# Patient Record
Sex: MALE | Race: WHITE | NOT HISPANIC OR LATINO
[De-identification: names, ages, dates, MRNs, and addresses within clinical notes are randomized per-mention and may not be internally consistent; named-entity substitution may affect disease eponyms.]

---

## 2017-06-15 ENCOUNTER — APPOINTMENT (OUTPATIENT)
Dept: SURGICAL ONCOLOGY | Facility: CLINIC | Age: 62
End: 2017-06-15

## 2018-12-16 ENCOUNTER — TRANSCRIPTION ENCOUNTER (OUTPATIENT)
Age: 63
End: 2018-12-16

## 2021-09-26 ENCOUNTER — NON-APPOINTMENT (OUTPATIENT)
Age: 66
End: 2021-09-26

## 2021-10-11 ENCOUNTER — NON-APPOINTMENT (OUTPATIENT)
Age: 66
End: 2021-10-11

## 2021-10-11 ENCOUNTER — APPOINTMENT (OUTPATIENT)
Dept: CARDIOLOGY | Facility: CLINIC | Age: 66
End: 2021-10-11
Payer: MEDICARE

## 2021-10-11 VITALS
SYSTOLIC BLOOD PRESSURE: 128 MMHG | OXYGEN SATURATION: 98 % | HEIGHT: 68 IN | BODY MASS INDEX: 33.34 KG/M2 | DIASTOLIC BLOOD PRESSURE: 80 MMHG | HEART RATE: 58 BPM | WEIGHT: 220 LBS

## 2021-10-11 VITALS — DIASTOLIC BLOOD PRESSURE: 66 MMHG | SYSTOLIC BLOOD PRESSURE: 130 MMHG

## 2021-10-11 DIAGNOSIS — R94.31 ABNORMAL ELECTROCARDIOGRAM [ECG] [EKG]: ICD-10-CM

## 2021-10-11 DIAGNOSIS — Z80.3 FAMILY HISTORY OF MALIGNANT NEOPLASM OF BREAST: ICD-10-CM

## 2021-10-11 DIAGNOSIS — U07.1 COVID-19: ICD-10-CM

## 2021-10-11 PROCEDURE — 93000 ELECTROCARDIOGRAM COMPLETE: CPT

## 2021-10-11 PROCEDURE — 99204 OFFICE O/P NEW MOD 45 MIN: CPT

## 2021-10-11 PROCEDURE — 36415 COLL VENOUS BLD VENIPUNCTURE: CPT

## 2021-10-11 RX ORDER — ALLOPURINOL 100 MG/1
100 TABLET ORAL
Refills: 0 | Status: ACTIVE | COMMUNITY

## 2021-10-11 RX ORDER — GLIPIZIDE 10 MG/1
10 TABLET ORAL
Refills: 0 | Status: ACTIVE | COMMUNITY

## 2021-10-11 RX ORDER — INSULIN ASPART 100 [IU]/ML
INJECTION, SOLUTION INTRAVENOUS; SUBCUTANEOUS
Refills: 0 | Status: ACTIVE | COMMUNITY

## 2021-10-11 NOTE — PHYSICAL EXAM
[Well Developed] : well developed [Well Nourished] : well nourished [No Acute Distress] : no acute distress [Obese] : obese [Normal S1, S2] : normal S1, S2 [Murmur] : murmur [Normal] : alert and oriented, normal memory [de-identified] : I/VI MURALI base [de-identified] : right base crackles [de-identified] : 1+ bilat leg and ankle pitting edema

## 2021-10-11 NOTE — DISCUSSION/SUMMARY
[FreeTextEntry1] : Sched echocardiogram to eval LV function given ECG consistent with prior MI, and dyspnea.\par Sched pharmacol nuc stress test to eval for myocardial ischemia given mult CV risk factors incl diabetes, abn ECG and recent symptom of HAGAN which may be anginal equivalent.  Pt unable to exercise on treadmill due to chronic back pain.\par check pro-PNP.\par Encourage weight loss and conditioning.\par Advised decrease metoprolol to 25 mg QD due to bradycardia.\par Obtain recent labs incl lipids - consider statin.\par \par

## 2021-10-11 NOTE — ASSESSMENT
[FreeTextEntry1] : Multiple CV risk factors incl diabetes.\par Dyspnea on exertion - possible anginal equivalent or CHF.\par May be multifactorial in etio incl obesity, deconditioning, prior COVID.\par \par

## 2021-10-11 NOTE — HISTORY OF PRESENT ILLNESS
[FreeTextEntry1] : ALONSO CARDENAS is a 66 year old male.\par Presents at rec of his pulmonary doctor.  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  He had COVID in January and had a prolonged cough that improved.  He went back to work in April.\par Compl of HAGAN on singing, walking.\par He has history of chronic back problem due to herniated discs and surgery with hardward.\par Was also getting upper abdominal pain with singing.\par No chest pain.\par Had labs done recently by PCP Dr Quintin Mota in [place].\par Meds reviewed w pt, reports he is also on amlodipine but unknown dose.\par Last stress test 2010 - pharmacologic due to chronic back pain.

## 2021-10-12 LAB — NT-PROBNP SERPL-MCNC: 120 PG/ML

## 2021-10-15 ENCOUNTER — APPOINTMENT (OUTPATIENT)
Dept: GASTROENTEROLOGY | Facility: CLINIC | Age: 66
End: 2021-10-15
Payer: MEDICARE

## 2021-10-15 VITALS
WEIGHT: 228 LBS | HEART RATE: 60 BPM | DIASTOLIC BLOOD PRESSURE: 62 MMHG | BODY MASS INDEX: 34.56 KG/M2 | SYSTOLIC BLOOD PRESSURE: 132 MMHG | HEIGHT: 68 IN

## 2021-10-15 DIAGNOSIS — K43.9 VENTRAL HERNIA W/OUT OBSTRUCTION OR GANGRENE: ICD-10-CM

## 2021-10-15 DIAGNOSIS — Z86.010 PERSONAL HISTORY OF COLONIC POLYPS: ICD-10-CM

## 2021-10-15 DIAGNOSIS — E66.9 OBESITY, UNSPECIFIED: ICD-10-CM

## 2021-10-15 DIAGNOSIS — K59.09 OTHER CONSTIPATION: ICD-10-CM

## 2021-10-15 PROCEDURE — 82274 ASSAY TEST FOR BLOOD FECAL: CPT | Mod: QW

## 2021-10-15 PROCEDURE — 99204 OFFICE O/P NEW MOD 45 MIN: CPT

## 2021-10-15 NOTE — ASSESSMENT
[FreeTextEntry1] : 1.  History of colonic polyps; chronic constipation--rule out metachronous colorectal neoplasia.  Status post colon resections, reversal of colostomy.\par 2.  Occasional heartburn, without alarm symptoms.\par 3.  Abdominal wall hernia, asymptomatic.\par 4.  Obesity.\par 5.  Longstanding type 2 diabetes mellitus with nephropathy (CKD 3).\par 6.  Hypertension.\par 7.  Exertional dyspnea--must rule out underlying coronary artery disease.\par 8.  Status post partial thyroidectomy for follicular adenoma 2015.\par 9.  Status post COVID-19 January 2021.\par 10.  DJD of spine, status post laminectomy.\par 11.  Status post leg, elbow, hand surgeries, and neck sebaceous cyst excision.\par \par Plan:\par 1.  Dr. Mota to forward latest labs for my review.\par 2.  Take low-dose MiraLAX regularly rather than on-demand.\par 3.  Await cardiac clearance before proceeding with surveillance colonoscopy.\par 4.  Colonoscopy next month, if clearable. He was advised to speak with his diabetes doctor about how to adjust his diabetes medicines prior to contemplated colonoscopy.  Procedure, rationale, alternatives, material risks, anesthesia plan, and PEG prep instructions were reviewed and brochure given.\par 5.  No plans for EGD in the absence of alarm symptoms.\par 6.  Would not advise abdominal wall hernia repair in the absence of symptoms.\par

## 2021-10-15 NOTE — PHYSICAL EXAM
[General Appearance - In No Acute Distress] : in no acute distress [General Appearance - Alert] : alert [General Appearance - Well Nourished] : well nourished [General Appearance - Well Developed] : well developed [Sclera] : the sclera and conjunctiva were normal [Neck Cervical Mass (___cm)] : no neck mass was observed [Jugular Venous Distention Increased] : there was no jugular-venous distention [Thyroid Diffuse Enlargement] : the thyroid was not enlarged [Thyroid Nodule] : there were no palpable thyroid nodules [Auscultation Breath Sounds / Voice Sounds] : lungs were clear to auscultation bilaterally [Heart Sounds] : normal S1 and S2 [Heart Sounds Gallop] : no gallops [Murmurs] : no murmurs [Heart Sounds Pericardial Friction Rub] : no pericardial rub [Full Pulse] : the pedal pulses are present [Edema] : there was no peripheral edema [Bowel Sounds] : normal bowel sounds [Abdomen Soft] : soft [Abdomen Tenderness] : non-tender [Abdomen Mass (___ Cm)] : no abdominal mass palpated [Normal Sphincter Tone] : normal sphincter tone [No Rectal Mass] : no rectal mass [Internal Hemorrhoid] : internal hemorrhoids [Prostate Size___ (Scale 0-4)] : prostate size was [unfilled] on a scale of 0-4 [Cervical Lymph Nodes Enlarged Posterior Bilaterally] : posterior cervical [Cervical Lymph Nodes Enlarged Anterior Bilaterally] : anterior cervical [Supraclavicular Lymph Nodes Enlarged Bilaterally] : supraclavicular [Inguinal Lymph Nodes Enlarged Bilaterally] : inguinal [Skin Turgor] : normal skin turgor [Skin Color & Pigmentation] : normal skin color and pigmentation [] : no rash [Oriented To Time, Place, And Person] : oriented to person, place, and time [Impaired Insight] : insight and judgment were intact [Affect] : the affect was normal [External Hemorrhoid] : no external hemorrhoids [Occult Blood Positive] : stool was negative for occult blood [Prostate Tenderness] : was not tender [FreeTextEntry1] : surgical scars

## 2021-10-15 NOTE — CONSULT LETTER
[Dear  ___] : Dear  [unfilled], [Consult Letter:] : I had the pleasure of evaluating your patient, [unfilled]. [Please see my note below.] : Please see my note below. [Consult Closing:] : Thank you very much for allowing me to participate in the care of this patient.  If you have any questions, please do not hesitate to contact me. [Sincerely,] : Sincerely, [FreeTextEntry3] : Julián Kirk M.D.\par  [DrShayy  ___] : Dr. WESTFALL

## 2021-10-15 NOTE — REVIEW OF SYSTEMS
[Negative] : Heme/Lymph [Shortness Of Breath] : shortness of breath [As Noted in HPI] : as noted in HPI [Constipation] : constipation [Heartburn] : heartburn

## 2021-10-15 NOTE — HISTORY OF PRESENT ILLNESS
[FreeTextEntry1] : Diego had undergone colon resection 1984 with colostomy that was reversed in 1985--he was advised that he might have swallowed some glass, and that was the cause of perforation.  He underwent another resection in 2010 (perforation, not cancer),  now has abdominal wall hernia.  He recalls having a polyp removed via colonoscopy many years ago; last colonoscopy was immediately prior to the 2010 surgery.  He notes chronic constipation, taking MiraLAX 1-2 times per week.  He also experiences rare heartburn, for which he would take OTC acid-reducer, with improvement.  He was diagnosed with COVID-19 this past January, has dyspnea on exertion and when singing, with recent pulmonary evaluation negative, to undergo cardiac evaluation (Dr. Canada) in the next 1-2 weeks.  He has longstanding type 2 diabetes mellitus with nephropathy.  Family history is negative for colorectal neoplasia.

## 2021-10-22 ENCOUNTER — APPOINTMENT (OUTPATIENT)
Dept: CARDIOLOGY | Facility: CLINIC | Age: 66
End: 2021-10-22
Payer: MEDICARE

## 2021-10-22 PROCEDURE — A9500: CPT

## 2021-10-22 PROCEDURE — 78452 HT MUSCLE IMAGE SPECT MULT: CPT

## 2021-10-22 PROCEDURE — 93015 CV STRESS TEST SUPVJ I&R: CPT

## 2021-10-26 ENCOUNTER — APPOINTMENT (OUTPATIENT)
Dept: CARDIOLOGY | Facility: CLINIC | Age: 66
End: 2021-10-26
Payer: MEDICARE

## 2021-10-26 PROCEDURE — 93306 TTE W/DOPPLER COMPLETE: CPT

## 2021-10-28 ENCOUNTER — APPOINTMENT (OUTPATIENT)
Dept: CARDIOLOGY | Facility: CLINIC | Age: 66
End: 2021-10-28

## 2021-11-01 ENCOUNTER — APPOINTMENT (OUTPATIENT)
Dept: CARDIOLOGY | Facility: CLINIC | Age: 66
End: 2021-11-01
Payer: MEDICARE

## 2021-11-01 VITALS
OXYGEN SATURATION: 97 % | BODY MASS INDEX: 34.56 KG/M2 | SYSTOLIC BLOOD PRESSURE: 140 MMHG | HEIGHT: 68 IN | DIASTOLIC BLOOD PRESSURE: 70 MMHG | WEIGHT: 228 LBS | HEART RATE: 58 BPM

## 2021-11-01 VITALS — DIASTOLIC BLOOD PRESSURE: 70 MMHG | SYSTOLIC BLOOD PRESSURE: 126 MMHG

## 2021-11-01 DIAGNOSIS — R06.00 DYSPNEA, UNSPECIFIED: ICD-10-CM

## 2021-11-01 DIAGNOSIS — I10 ESSENTIAL (PRIMARY) HYPERTENSION: ICD-10-CM

## 2021-11-01 DIAGNOSIS — Z86.79 PERSONAL HISTORY OF OTHER DISEASES OF THE CIRCULATORY SYSTEM: ICD-10-CM

## 2021-11-01 PROCEDURE — 99214 OFFICE O/P EST MOD 30 MIN: CPT

## 2021-11-01 RX ORDER — METOPROLOL SUCCINATE 25 MG/1
25 TABLET, EXTENDED RELEASE ORAL
Refills: 0 | Status: ACTIVE | COMMUNITY

## 2021-11-01 NOTE — DISCUSSION/SUMMARY
[FreeTextEntry1] : Stable and optimized cardiac status without contraindication to endoscopic GI procedures as planned.\par Contin current antihypertensive meds.\par Discussed adding statin given hist of diabetes.  Pt reports intolerance previously to statin.\par Will try to ascertain which med and dose and consider rechallenging.\par Follow up for repeat echo 1 year.\par

## 2021-11-01 NOTE — HISTORY OF PRESENT ILLNESS
[FreeTextEntry1] : ALONSO CARDENAS is a 66 year old male here for follow up.\par No new complaints. \par Echo showed normal LV function with calcified AV but no AS, and mild mitral stenosis.\par Pharm nuc stress test was negative for ischemia.\par

## 2021-11-01 NOTE — PHYSICAL EXAM
[Well Developed] : well developed [Well Nourished] : well nourished [No Acute Distress] : no acute distress [Obese] : obese [Normal Conjunctiva] : normal conjunctiva [Normal Venous Pressure] : normal venous pressure [No Carotid Bruit] : no carotid bruit [Normal S1, S2] : normal S1, S2 [No Rub] : no rub [No Gallop] : no gallop [Murmur] : murmur [Clear Lung Fields] : clear lung fields [Good Air Entry] : good air entry [No Respiratory Distress] : no respiratory distress  [Soft] : abdomen soft [Non Tender] : non-tender [No Masses/organomegaly] : no masses/organomegaly [Normal Bowel Sounds] : normal bowel sounds [Normal Gait] : normal gait [No Edema] : no edema [No Cyanosis] : no cyanosis [No Clubbing] : no clubbing [No Varicosities] : no varicosities [No Rash] : no rash [No Skin Lesions] : no skin lesions [Moves all extremities] : moves all extremities [No Focal Deficits] : no focal deficits [Normal Speech] : normal speech [Alert and Oriented] : alert and oriented [Normal memory] : normal memory [de-identified] : I/VI MURALI base

## 2021-11-02 ENCOUNTER — NON-APPOINTMENT (OUTPATIENT)
Age: 66
End: 2021-11-02

## 2021-11-19 ENCOUNTER — LABORATORY RESULT (OUTPATIENT)
Age: 66
End: 2021-11-19

## 2021-11-19 ENCOUNTER — APPOINTMENT (OUTPATIENT)
Dept: DISASTER EMERGENCY | Facility: CLINIC | Age: 66
End: 2021-11-19

## 2021-11-24 ENCOUNTER — APPOINTMENT (OUTPATIENT)
Dept: GASTROENTEROLOGY | Facility: CLINIC | Age: 66
End: 2021-11-24
Payer: MEDICARE

## 2021-11-24 PROCEDURE — 45378 DIAGNOSTIC COLONOSCOPY: CPT

## 2023-07-26 ENCOUNTER — APPOINTMENT (OUTPATIENT)
Dept: ORTHOPEDIC SURGERY | Facility: CLINIC | Age: 68
End: 2023-07-26
Payer: OTHER MISCELLANEOUS

## 2023-07-26 VITALS — HEIGHT: 68 IN | WEIGHT: 217 LBS | BODY MASS INDEX: 32.89 KG/M2

## 2023-07-26 DIAGNOSIS — M54.14 RADICULOPATHY, THORACIC REGION: ICD-10-CM

## 2023-07-26 PROCEDURE — 72100 X-RAY EXAM L-S SPINE 2/3 VWS: CPT

## 2023-07-26 PROCEDURE — 99204 OFFICE O/P NEW MOD 45 MIN: CPT

## 2023-07-26 PROCEDURE — 72070 X-RAY EXAM THORAC SPINE 2VWS: CPT

## 2023-07-26 RX ORDER — DICLOFENAC SODIUM 75 MG/1
75 TABLET, DELAYED RELEASE ORAL TWICE DAILY
Qty: 60 | Refills: 1 | Status: COMPLETED | COMMUNITY
Start: 2023-07-26 | End: 2023-09-24

## 2023-07-28 NOTE — IMAGING
[de-identified] : Lumbar Spine Exam: DTRs are knees 1+ equal, ankles trace equal. SLR is negative bilaterally. Lumbar flexion is to approximately 45 degrees and is uncomfortable diffusely. Lumbar extension is immediately painful, with greater intensity than lumbar flexion. Pt is able to heel and toe walk. \par \par Thoracic Spine Exam: There were no thoracic radicular symptoms during the exam.\par \par Lumbar Spine X-Rays: Normal lordosis. Fusion L5-S1 with pedicle screw fixation. \par \par Thoracic Spine X-Rays: Multilevel DDD. No compression fractures. Diffuse osteopenia.

## 2023-07-28 NOTE — HISTORY OF PRESENT ILLNESS
[Work related] : work related [Gradual] : gradual [8] : 8 [Burning] : burning [Dull/Aching] : dull/aching [Radiating] : radiating [Sharp] : sharp [Shooting] : shooting [Stabbing] : stabbing [Throbbing] : throbbing [Tightness] : tightness [Constant] : constant [Household chores] : household chores [Leisure] : leisure [Sleep] : sleep [Nothing helps with pain getting better] : Nothing helps with pain getting better [Sitting] : sitting [Walking] : walking [Stairs] : stairs [Lying in bed] : lying in bed [Not working due to injury] : Work status: not working due to injury [de-identified] : 07/26/2023: WC DOI: 3/22/06\par Pt reports that he has been seen for the same care in 2006 for the lumbar spine, had surgery. Patient states pain has increased over a year now, has pain in the both legs and bilateral hips . Unable to walk without pain.  Pt reports that he feels weakness in his legs and that his legs feel wobbly while walking.  [] : no [FreeTextEntry1] : Thoracic spine  [FreeTextEntry2] : 3/22/06 [FreeTextEntry5] : 68 Year old male is here WC for the Thoracic spine, patient states he has been seen for the same care in 2006 for the lumbar spine, had surgery. Patient states pain has increased over a year now, has pain in the both legs and bilateral hips . Unable to walk without pain.  [FreeTextEntry7] : Arms, shoulder, hips and down the leg [de-identified] : 2006 [de-identified] : 2006

## 2023-07-31 ENCOUNTER — APPOINTMENT (OUTPATIENT)
Dept: MRI IMAGING | Facility: CLINIC | Age: 68
End: 2023-07-31
Payer: OTHER MISCELLANEOUS

## 2023-07-31 PROCEDURE — 72146 MRI CHEST SPINE W/O DYE: CPT

## 2023-07-31 PROCEDURE — 72148 MRI LUMBAR SPINE W/O DYE: CPT

## 2023-08-09 ENCOUNTER — APPOINTMENT (OUTPATIENT)
Dept: ORTHOPEDIC SURGERY | Facility: CLINIC | Age: 68
End: 2023-08-09
Payer: OTHER MISCELLANEOUS

## 2023-08-09 VITALS — WEIGHT: 217 LBS | BODY MASS INDEX: 32.89 KG/M2 | HEIGHT: 68 IN

## 2023-08-09 PROCEDURE — 99214 OFFICE O/P EST MOD 30 MIN: CPT

## 2023-08-09 RX ORDER — IBUPROFEN 800 MG/1
800 TABLET, FILM COATED ORAL TWICE DAILY
Qty: 60 | Refills: 0 | Status: ACTIVE | COMMUNITY
Start: 2023-08-09 | End: 1900-01-01

## 2023-08-09 NOTE — HISTORY OF PRESENT ILLNESS
[Lower back] : lower back [Work related] : work related [Gradual] : gradual [8] : 8 [Burning] : burning [Dull/Aching] : dull/aching [Radiating] : radiating [Sharp] : sharp [Shooting] : shooting [Stabbing] : stabbing [Throbbing] : throbbing [Tightness] : tightness [Constant] : constant [Household chores] : household chores [Leisure] : leisure [Sleep] : sleep [Nothing helps with pain getting better] : Nothing helps with pain getting better [Sitting] : sitting [Walking] : walking [Stairs] : stairs [Lying in bed] : lying in bed [Not working due to injury] : Work status: not working due to injury [de-identified] : 07/26/2023:  DOI: 3/22/06 Pt reports that he has been seen for the same care in 2006 for the lumbar spine, had surgery. Patient states pain has increased over a year now, has pain in the both legs and bilateral hips . Unable to walk without pain.  Pt reports that he feels weakness in his legs and that his legs feel wobbly while walking.   08/09/2023:  DOI: 3/22/06 Pt is here today for a follow up for Thoracic and Lumbar spine MRI results. Pt reports that diclofenac produced no improvement at all. His main problem is greatly reduced walking and standing endurance. His walking endurance is less than a city block. He is forced to sit and wait for the symptoms to subside. The pain which brings him to a stop is symmetrical and includes paresthesias, sensory changes and pain in both thighs, consistent with neuroclaudication. There is no trend of improvement.   ** Lumbar Spine MRI results taken on 07/31/23** IMPRESSION: 1. T10-T11: Loss of disc signal and height. Broad bulge. 2. T11-T12: Loss of disc signal and height. Broad bulge and superimposed herniation. 3. L1-L2: Facet hypertrophy, ligamentum flavum hypertrophy, and left facet effusion. 4. L2-L3: Facet hypertrophy and ligamentum flavum hypertrophy. 5. L3-L4: Facet hypertrophy, ligamentum flavum hypertrophy, and facet effusion. 6. L4-L5: Loss of disc signal and height. Minor retrolisthesis. Broad bulge, facet arthrosis, ligamentum flavum hypertrophy, and facet effusion with foraminal stenosis. Broad herniation with mild-to-moderate central stenosis mostly due to lateral mass effect on the thecal sac by the facet arthrosis. Findings are progressed from the prior study.  7. L5-S1: Postop change with bilateral pedicle screws. Disc graft in good position asymmetric to right at midline. Bulge and facet arthrosis with inferior right foraminal stenosis. Epidural lipomatosis with moderate central stenosis. Findings are not significantly changed from the prior study. 8. 10 mm nonspecific lesion at posterior inferior L4 with minimal surrounding edema. This is unchanged from the prior study and may represent atypical hemangioma. Thin-cut CAT scan is suggested for further evaluation.   ** Thoracic Spine MRI results taken on 07/31/23** 1. Multilevel loss of disc signal and height with no fracture. 2. T5-T6: There is central herniation. 3. T6-T7: There is central and asymmetric to right herniation at T6-T7. 4. T7-T8: Central and asymmetric to left herniation and annular fissure. 5. T8-T9: Central herniation and annular fissure.  [] : no [FreeTextEntry1] : Thoracic spine  [FreeTextEntry2] : 3/22/06 [FreeTextEntry5] : 68 Year old male is here WC for the Thoracic spine, patient states he has been seen for the same care in 2006 for the lumbar spine, had surgery. Patient states pain has increased over a year now, has pain in the both legs and bilateral hips . Unable to walk without pain.  [FreeTextEntry7] : Arms, shoulder, hips and down the leg [de-identified] : 2006 [de-identified] : 2006

## 2023-08-09 NOTE — DISCUSSION/SUMMARY
[de-identified] : 1) I discussed the risks, benefits and alternatives of treatment options for the thoracic and lumbar spine, including activity modification, rest, home exercise, oral antiinflammatory medication,  2) ** Rx Ibuprofen 800 mg 1BID PRN. The patient may combine each dose of this medication with 1 tablet of OTC Tylenol ES (500mg). 3) Pt will continue with activity modification and home exercise as tolerated.  The patient should avoid exercise and activity that aggravates pain.  4) I highly recommend that the patient consult with Dr. Mclaughlin regarding workup of lumbar spinal stenosis.    The patient will continue with conservative treatment as described above, and will F/U after consulting with Dr. Mclaughlin.    The patient was advised of the diagnosis.  The natural history of the pathology was explained in full to the patient in layman's terms, including but not limited to the risks, symptoms and available options for treatment.  We discussed the risks, benefits and alternatives of the treatment options and the advice I provided to the patient as listed above.  Pt was given the opportunity to ask questions, and all questions were answered.  The discussion was not limited to the above.  Entered by Yumiko Zamorano acting as scribe.

## 2023-08-09 NOTE — IMAGING
[de-identified] : Lumbar Spine Exam: DTRs are knees 1+ equal, ankles trace equal. SLR is negative bilaterally. Lumbar flexion is to approximately 45 degrees and is uncomfortable diffusely. Lumbar extension is immediately painful, with greater intensity than lumbar flexion. Pt is able to heel and toe walk. \par  \par  Thoracic Spine Exam: There were no thoracic radicular symptoms during the exam.\par  \par  Lumbar Spine X-Rays: Normal lordosis. Fusion L5-S1 with pedicle screw fixation. \par  \par  Thoracic Spine X-Rays: Multilevel DDD. No compression fractures. Diffuse osteopenia.

## 2023-08-09 NOTE — ASSESSMENT
[FreeTextEntry1] : MRIs of the thoracic spine and lumbar spine performed through [facility] on 07/31/23 were discussed in detail with the patient. There is central canal stenosis seen at L4-L5 and at L5-S1. L5-S1 is the site of fusion with pedicle screws by Dr. Taylor in approximately 2010. As far as treatment is concerned, he will consult with Dr. Mclaughlin regarding treatment of spinal stenosis. Medication will remain at his current regimen of ibuprofen 800 mg and a dose of Tylenol will be added to this.

## 2023-08-21 ENCOUNTER — APPOINTMENT (OUTPATIENT)
Dept: ORTHOPEDIC SURGERY | Facility: CLINIC | Age: 68
End: 2023-08-21

## 2023-08-22 ENCOUNTER — APPOINTMENT (OUTPATIENT)
Dept: ORTHOPEDIC SURGERY | Facility: CLINIC | Age: 68
End: 2023-08-22
Payer: OTHER MISCELLANEOUS

## 2023-08-22 VITALS — WEIGHT: 217 LBS | HEIGHT: 68 IN | BODY MASS INDEX: 32.89 KG/M2

## 2023-08-22 PROCEDURE — 99214 OFFICE O/P EST MOD 30 MIN: CPT
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
## 2023-08-24 NOTE — ASSESSMENT
[FreeTextEntry1] : Chronic LBP w/ b/l radic; h/o of L5-S1 fusion in 2006. Symptoms relate to spinal stenosis. LS MRI: L4-5 HNP with moderate central stenosis, L5-S1 prior fusion. TS MRI: w/o instability, deformities or fx. Had SAMARIA's in the past w/o relief. Start PT for lumbar and thoracic pain. Discussed if PT isn't helpful will defer to pain mgmt. F/up 4-6 weeks. Prescribed pain meds.

## 2023-08-24 NOTE — DATA REVIEWED
[MRI] : MRI [Thoracic Spine] : thoracic spine [Lumbar Spine] : lumbar spine [Report was reviewed and noted in the chart] : The report was reviewed and noted in the chart [I independently reviewed and interpreted images and report] : I independently reviewed and interpreted images and report [I reviewed the films/CD] : I reviewed the films/CD [FreeTextEntry1] : LS MRI: L4-5 HNP with moderate central stenosis, L5-S1 prior fusion.  TS MRI: w/o instability, deformiies or fx.

## 2023-08-24 NOTE — IMAGING
[de-identified] : LSPINE Inspection: no defects, deformity Palpation: No tenderness or spasm in bilateral and lumbar paraspinal musculature ROM: Full with stiffness/diminished all planes Motor: no focal deficit  Strength: 5/5 bilateral hip flexors, knee extensors, ankle dorsiflexors, EHL, ankle plantarflexors Sensation I LT  - SLR B/L  Toe and heal walking intact  Gait non antalgic, non myelopathic

## 2023-09-19 ENCOUNTER — APPOINTMENT (OUTPATIENT)
Dept: ORTHOPEDIC SURGERY | Facility: CLINIC | Age: 68
End: 2023-09-19
Payer: OTHER MISCELLANEOUS

## 2023-09-19 VITALS — WEIGHT: 217 LBS | HEIGHT: 68 IN | BODY MASS INDEX: 32.89 KG/M2

## 2023-09-19 PROCEDURE — 99213 OFFICE O/P EST LOW 20 MIN: CPT

## 2023-10-23 ENCOUNTER — APPOINTMENT (OUTPATIENT)
Dept: ORTHOPEDIC SURGERY | Facility: CLINIC | Age: 68
End: 2023-10-23
Payer: OTHER MISCELLANEOUS

## 2023-10-23 PROCEDURE — 99214 OFFICE O/P EST MOD 30 MIN: CPT

## 2023-11-10 ENCOUNTER — APPOINTMENT (OUTPATIENT)
Dept: PAIN MANAGEMENT | Facility: CLINIC | Age: 68
End: 2023-11-10
Payer: OTHER MISCELLANEOUS

## 2023-11-10 VITALS — BODY MASS INDEX: 31.83 KG/M2 | WEIGHT: 210 LBS | HEIGHT: 68 IN

## 2023-11-10 PROCEDURE — 99244 OFF/OP CNSLTJ NEW/EST MOD 40: CPT

## 2023-11-10 RX ORDER — ASPIRIN ENTERIC COATED TABLETS 81 MG 81 MG/1
81 TABLET, DELAYED RELEASE ORAL
Refills: 0 | Status: DISCONTINUED | COMMUNITY
End: 2023-11-10

## 2023-12-04 ENCOUNTER — APPOINTMENT (OUTPATIENT)
Dept: ORTHOPEDIC SURGERY | Facility: CLINIC | Age: 68
End: 2023-12-04
Payer: OTHER MISCELLANEOUS

## 2023-12-04 VITALS — HEIGHT: 68 IN | WEIGHT: 210 LBS | BODY MASS INDEX: 31.83 KG/M2

## 2023-12-04 DIAGNOSIS — M51.24 OTHER INTERVERTEBRAL DISC DISPLACEMENT, THORACIC REGION: ICD-10-CM

## 2023-12-04 DIAGNOSIS — M54.6 PAIN IN THORACIC SPINE: ICD-10-CM

## 2023-12-04 PROCEDURE — 99214 OFFICE O/P EST MOD 30 MIN: CPT

## 2024-01-08 ENCOUNTER — APPOINTMENT (OUTPATIENT)
Dept: ORTHOPEDIC SURGERY | Facility: CLINIC | Age: 69
End: 2024-01-08
Payer: OTHER MISCELLANEOUS

## 2024-01-08 DIAGNOSIS — Z98.1 ARTHRODESIS STATUS: ICD-10-CM

## 2024-01-08 PROCEDURE — 99213 OFFICE O/P EST LOW 20 MIN: CPT

## 2024-01-08 NOTE — ASSESSMENT
[FreeTextEntry1] : Chronic LBP w/ b/l radic; h/o of L5-S1 fusion in 2006. Symptoms relate to spinal stenosis. Went over imaging again: LS-MRI: L4-5 HNP w/ moderate central stenosis, L5-S1 prior fusion. Awaiting approval for LESI #1 with Dr Matthew. PT denied, encouraged HEP with core strengthening exercises. Renewed pain meds. F/up in 1 month with Dr. Matthew. [facility]

## 2024-01-08 NOTE — IMAGING
[de-identified] : LSPINE Inspection: no defects, deformity - healed incision Palpation: No tenderness or spasm in bilateral and lumbar paraspinal musculature ROM: diminished all planes Motor: no focal deficit  Strength: 5/5 bilateral hip flexors, knee extensors, ankle dorsiflexors, EHL, ankle plantarflexors Sensation I - SLR B/L  Toe and heal walking intact but difficult Gait antalgic, non myelopathic
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
## 2024-01-18 NOTE — PHYSICAL EXAM
[de-identified] : Constitutional; Appears well, no apparent distress Ability to communicate: Normal  Respiratory: non-labored breathing Skin: No rash noted Head: Normocephalic, atraumatic Neck: no visible thyroid enlargement Eyes: Extraocular movements intact Neurologic: Alert and oriented x3 Psychiatric: normal mood, affect and behavior [] : non-antalgic

## 2024-01-18 NOTE — DISCUSSION/SUMMARY
[de-identified] : After discussing various treatment options with the patient including but not limited to oral medications, physical therapy, exercise modalities as well as interventional spinal injections, we have decided with the following plan:  - Continue Home exercises, stretching, activity modification, physical therapy, and conservative care. - MRI report and/or images was reviewed and discussed with the patient. - Recommend L4-5 Lumbar Epidural Steroid Injection under fluoroscopic guidance with image. - The risks, benefits and alternatives of the proposed procedure were explained in detail with the patient. The risks outlined include but are not limited to infection, bleeding, post-dural puncture headache, nerve injury, a temporary increase in pain, failure to resolve symptoms, allergic reaction, symptom recurrence, and possible elevation of blood sugar in diabetics. All questions were answered to patient's apparent satisfaction and he/she verbalized an understanding. - Patient is presenting with acute/sub-acute radicular pain with impairment in ADLs and functionality.  The pain has not responded to conservative care including NSAID therapy and/or physical therapy.  There is no bleeding tendency, unstable medical condition, or systemic infection. - Follow up in 1-2 weeks post injection for re-evaluation.  Addendum 12/21/2023: Pt counselled and aware that steroids from LESI can increase blood sugar, Patient takes insulin and will monitor blood sugar after injection.  Addendum 01/18/2024: MRI L-spine showing L4-5 disc herniation with moderate central stenosis and lateral mass effect on the thecal sac. Pt with decrease sensation in the b/l lower extremities.

## 2024-01-18 NOTE — HISTORY OF PRESENT ILLNESS
[FreeTextEntry1] : Initial HPI 11/10/2023: WC 3/22/06 fell through scaffolding  Pain is in the center of the lower back and radiates down the bilateral thighs and lower legs to the toes described as a sharp pain with associated numbness and tingling. Saw Dr. Mclaughlin who recommended LESI.   MRI Lumbar Spine 7/31/23 independently reviewed: L4-5 HNP with moderate central stenosis, Conservative Care: has completed PT with little relief  Pain Medications: ibuprofen 800mg, percocet 5/325  Past Injections: has ESIs prior to his surgery with no relief  Spine surgery: L5-S1 fusion in 2006. Blood thinners: none [] : Patient is currently injured and not playing sports: no [FreeTextEntry7] : B/L legs  [de-identified] : L MRI

## 2024-02-16 ENCOUNTER — APPOINTMENT (OUTPATIENT)
Dept: PAIN MANAGEMENT | Facility: CLINIC | Age: 69
End: 2024-02-16
Payer: OTHER MISCELLANEOUS

## 2024-02-16 VITALS — BODY MASS INDEX: 31.83 KG/M2 | WEIGHT: 210 LBS | HEIGHT: 68 IN

## 2024-02-16 DIAGNOSIS — M48.062 SPINAL STENOSIS, LUMBAR REGION WITH NEUROGENIC CLAUDICATION: ICD-10-CM

## 2024-02-16 PROCEDURE — 99214 OFFICE O/P EST MOD 30 MIN: CPT

## 2024-02-16 NOTE — DISCUSSION/SUMMARY
[de-identified] : After discussing various treatment options with the patient including but not limited to oral medications, physical therapy, exercise modalities as well as interventional spinal injections, we have decided with the following plan:  - Continue Home exercises, stretching, activity modification, physical therapy, and conservative care. - MRI report and/or images was reviewed and discussed with the patient. - Recommend L4-5 Lumbar Epidural Steroid Injection under fluoroscopic guidance with image. - The risks, benefits and alternatives of the proposed procedure were explained in detail with the patient. The risks outlined include but are not limited to infection, bleeding, post-dural puncture headache, nerve injury, a temporary increase in pain, failure to resolve symptoms, allergic reaction, symptom recurrence, and possible elevation of blood sugar in diabetics. All questions were answered to patient's apparent satisfaction and he/she verbalized an understanding. - Patient is presenting with acute/sub-acute radicular pain with impairment in ADLs and functionality.  The pain has not responded to conservative care including NSAID therapy and/or physical therapy.  There is no bleeding tendency, unstable medical condition, or systemic infection. - Follow up in 1-2 weeks post injection for re-evaluation.  - Will refill Oxycodone/Acetaminophen 5/325 Q12hrs PRN #50 for pain control. I have consulted the  Registry for the purpose of reviewing the patient's controlled substances. Risks of opioid use for chronic non-cancer pain discussed at length, including development of tolerance, addiction, opioid-induced hyperalgesia, hypogonadism, disturbance of sleep, etc. Explained that current medical evidence does not support the long-term use of opioids for this patient's condition, and that our plan is to eventually discontinue opioids altogether   Addendum 12/21/2023: Pt counselled and aware that steroids from LESI can increase blood sugar, Patient takes insulin and will monitor blood sugar after injection.  Addendum 01/18/2024: MRI L-spine showing L4-5 disc herniation with moderate central stenosis and lateral mass effect on the thecal sac. Pt with decrease sensation in the b/l lower extremities.

## 2024-02-16 NOTE — PHYSICAL EXAM
[de-identified] : Constitutional; Appears well, no apparent distress Ability to communicate: Normal  Respiratory: non-labored breathing Skin: No rash noted Head: Normocephalic, atraumatic Neck: no visible thyroid enlargement Eyes: Extraocular movements intact Neurologic: Alert and oriented x3 Psychiatric: normal mood, affect and behavior [] : non-antalgic

## 2024-02-16 NOTE — HISTORY OF PRESENT ILLNESS
[Lower back] : lower back [9] : 9 [7] : 7 [Burning] : burning [Dull/Aching] : dull/aching [Radiating] : radiating [Sharp] : sharp [Shooting] : shooting [Stabbing] : stabbing [Constant] : constant [Household chores] : household chores [Leisure] : leisure [Sleep] : sleep [Nothing helps with pain getting better] : Nothing helps with pain getting better [Sitting] : sitting [Standing] : standing [Walking] : walking [Work related] : work related [Not working due to injury] : Work status: not working due to injury [FreeTextEntry1] : 02/16/2024: Was supposed to have L4-5 LESI but just recently got approval and would like to schedule for SAMARIA. Pain unchanged since previous visit and still radiating down the b/l legs to the toes.   Initial HPI 11/10/2023: WC 3/22/06 fell through scaffolding  Pain is in the center of the lower back and radiates down the bilateral thighs and lower legs to the toes described as a sharp pain with associated numbness and tingling. Saw Dr. Mclaughlin who recommended LESI.   MRI Lumbar Spine 7/31/23 independently reviewed: L4-5 HNP with moderate central stenosis, Conservative Care: has completed PT with little relief  Pain Medications: ibuprofen 800mg, percocet 5/325  Past Injections: has ESIs prior to his surgery with no relief  Spine surgery: L5-S1 fusion in 2006. Blood thinners: none [] : Patient is currently injured and not playing sports: no [FreeTextEntry3] : 03/22/06 [FreeTextEntry7] : B/L legs  [de-identified] : L MRI

## 2024-03-04 ENCOUNTER — APPOINTMENT (OUTPATIENT)
Dept: PAIN MANAGEMENT | Facility: CLINIC | Age: 69
End: 2024-03-04
Payer: OTHER MISCELLANEOUS

## 2024-03-04 PROCEDURE — 62323 NJX INTERLAMINAR LMBR/SAC: CPT

## 2024-03-04 NOTE — PROCEDURE
[FreeTextEntry3] : Date of Service: 03/04/2024   Account: [id number]  Patient: ALONSO CARDENAS   YOB: 1955  Age: 68 year   Surgeon:                                                         Ferdinand Matthew D.O.  Pre-Operative Diagnosis:                             Lumbosacral radiculitis  Post-Operative Diagnosis:                           Same  Procedure:                                                      Interlaminar lumbar epidural steroid injection (L4-5) under fluoroscopic guidance  Anesthesia:                                                     Local with MAC   This procedure was carried out using fluoroscopic guidance.  The risks and benefits of the procedure were discussed extensively with the patient.  The consent of the patient was obtained and the following procedure was performed.  The patient was placed in the prone position.  The lumbar area was prepped and draped in a sterile fashion.  A timeout was performed with all essential staff present and the site and side were verified. Under AP view with slight cephalad-caudad angulation, the L4-5 interspace was identified and marked.  Using sterile technique, the superficial skin was anesthetized with 1% Lidocaine without epinephrine.  A 20-gauge Tuohy needle was advanced into the epidural space under fluoroscopy using lniin-[id number]-uxsmt technique and using loss of resistance at the L4-5 level.  After negative aspiration for heme or CSF, an epidurogram was obtained using 2-3 cc Omnipaque contrast injected under live fluoroscopy, confirming epidural placement of the needle.    Epidurogram showed no evidence of intrathecal or intravascular flow, and good evidence of bilateral epidural flow from L3-S2 levels.  After this, 4 cc of preservative free normal saline plus 12 mg of betamethasone were injected into the epidural space.  The needle was subsequently removed.  Anesthesia personnel were present throughout the procedure.  The patient tolerated the procedure well and was instructed to contact me immediately if there were any problems.  Ferdinand Matthew D.O.

## 2024-03-22 ENCOUNTER — APPOINTMENT (OUTPATIENT)
Dept: PAIN MANAGEMENT | Facility: CLINIC | Age: 69
End: 2024-03-22
Payer: OTHER MISCELLANEOUS

## 2024-03-22 VITALS — WEIGHT: 210 LBS | HEIGHT: 68 IN | BODY MASS INDEX: 31.83 KG/M2

## 2024-03-22 PROCEDURE — 99214 OFFICE O/P EST MOD 30 MIN: CPT

## 2024-03-22 NOTE — PHYSICAL EXAM
[de-identified] : Constitutional; Appears well, no apparent distress Ability to communicate: Normal  Respiratory: non-labored breathing Skin: No rash noted Head: Normocephalic, atraumatic Neck: no visible thyroid enlargement Eyes: Extraocular movements intact Neurologic: Alert and oriented x3 Psychiatric: normal mood, affect and behavior [] : non-antalgic

## 2024-03-22 NOTE — HISTORY OF PRESENT ILLNESS
[Lower back] : lower back [Work related] : work related [9] : 9 [Burning] : burning [Dull/Aching] : dull/aching [Radiating] : radiating [Sharp] : sharp [Stabbing] : stabbing [Shooting] : shooting [Constant] : constant [Household chores] : household chores [Leisure] : leisure [Sleep] : sleep [Nothing helps with pain getting better] : Nothing helps with pain getting better [Sitting] : sitting [Standing] : standing [Walking] : walking [Not working due to injury] : Work status: not working due to injury [8] : 8 [FreeTextEntry1] : 03/22/2024 : s/p L4-5 LESI on 03/04/24 with 50% relief and improvement of ADLs.    02/16/2024: Was supposed to have L4-5 LESI but just recently got approval and would like to schedule for SAMARIA. Pain unchanged since previous visit and still radiating down the b/l legs to the toes.   Initial HPI 11/10/2023: WC 3/22/06 fell through scaffolding  Pain is in the center of the lower back and radiates down the bilateral thighs and lower legs to the toes described as a sharp pain with associated numbness and tingling. Saw Dr. Mclaughlin who recommended LESI.   MRI Lumbar Spine 7/31/23 independently reviewed: L4-5 HNP with moderate central stenosis, Conservative Care: has completed PT with little relief  Pain Medications: ibuprofen 800mg, percocet 5/325  Past Injections: has ESIs prior to his surgery with no relief  Spine surgery: L5-S1 fusion in 2006. Blood thinners: none [] : Patient is currently playing sports: no [FreeTextEntry7] : B/L legs  [FreeTextEntry3] : 03/22/06 [de-identified] : L MRI

## 2024-03-22 NOTE — DISCUSSION/SUMMARY
[de-identified] : After discussing various treatment options with the patient including but not limited to oral medications, physical therapy, exercise modalities as well as interventional spinal injections, we have decided with the following plan:  - Continue Home exercises, stretching, activity modification, physical therapy, and conservative care. - MRI report and/or images was reviewed and discussed with the patient. - Recommend L4-5 Lumbar Epidural Steroid Injection under fluoroscopic guidance with image. - The risks, benefits and alternatives of the proposed procedure were explained in detail with the patient. The risks outlined include but are not limited to infection, bleeding, post-dural puncture headache, nerve injury, a temporary increase in pain, failure to resolve symptoms, allergic reaction, symptom recurrence, and possible elevation of blood sugar in diabetics. All questions were answered to patient's apparent satisfaction and he/she verbalized an understanding. - Patient is presenting with acute/sub-acute radicular pain with impairment in ADLs and functionality.  The pain has not responded to conservative care including NSAID therapy and/or physical therapy.  There is no bleeding tendency, unstable medical condition, or systemic infection. - Follow up in 1-2 weeks post injection for re-evaluation. - Will prescribe Oxycodone/Acetaminophen 5/325 Q12hrs PRN #50 for pain control. I have consulted the  Registry for the purpose of reviewing the patient's controlled substances. Risks of opioid use for chronic non-cancer pain discussed at length, including development of tolerance, addiction, opioid-induced hyperalgesia, hypogonadism, disturbance of sleep, etc. Explained that current medical evidence does not support the long-term use of opioids for this patient's condition, and that our plan is to eventually discontinue opioids altogether   - Will refill Oxycodone/Acetaminophen 5/325 Q12hrs PRN #50 for pain control. I have consulted the  Registry for the purpose of reviewing the patient's controlled substances. Risks of opioid use for chronic non-cancer pain discussed at length, including development of tolerance, addiction, opioid-induced hyperalgesia, hypogonadism, disturbance of sleep, etc. Explained that current medical evidence does not support the long-term use of opioids for this patient's condition, and that our plan is to eventually discontinue opioids altogether   Addendum 12/21/2023: Pt counselled and aware that steroids from LESI can increase blood sugar, Patient takes insulin and will monitor blood sugar after injection.  Addendum 01/18/2024: MRI L-spine showing L4-5 disc herniation with moderate central stenosis and lateral mass effect on the thecal sac. Pt with decrease sensation in the b/l lower extremities.

## 2024-05-20 ENCOUNTER — APPOINTMENT (OUTPATIENT)
Dept: PAIN MANAGEMENT | Facility: CLINIC | Age: 69
End: 2024-05-20
Payer: OTHER MISCELLANEOUS

## 2024-05-20 PROCEDURE — 62323 NJX INTERLAMINAR LMBR/SAC: CPT

## 2024-05-20 NOTE — PROCEDURE
[FreeTextEntry3] : Date of Service: 05/20/2024   Account: [id number]  Patient: ALONSO CARDENAS   YOB: 1955  Age: 68 year   Surgeon:                                                         Ferdinand Matthew D.O.  Pre-Operative Diagnosis:                             Lumbosacral radiculitis  Post-Operative Diagnosis:                           Same  Procedure:                                                      Interlaminar lumbar epidural steroid injection (L4-5) under fluoroscopic guidance  Anesthesia:                                                     Local with MAC   This procedure was carried out using fluoroscopic guidance.  The risks and benefits of the procedure were discussed extensively with the patient.  The consent of the patient was obtained and the following procedure was performed.  The patient was placed in the prone position.  The lumbar area was prepped and draped in a sterile fashion.  A timeout was performed with all essential staff present and the site and side were verified. Under AP view with slight cephalad-caudad angulation, the L4-5 interspace was identified and marked.  Using sterile technique, the superficial skin was anesthetized with 1% Lidocaine without epinephrine.  A 20-gauge Tuohy needle was advanced into the epidural space under fluoroscopy using waxhp-[id number]-phtyt technique and using loss of resistance at the L4-5 level.  After negative aspiration for heme or CSF, an epidurogram was obtained using 2-3 cc Omnipaque contrast injected under live fluoroscopy, confirming epidural placement of the needle.    Epidurogram showed no evidence of intrathecal or intravascular flow, and good evidence of bilateral epidural flow from L3-S2 levels.  After this, 4 cc of preservative free normal saline plus 12 mg of betamethasone were injected into the epidural space.  The needle was subsequently removed.  Anesthesia personnel were present throughout the procedure.  The patient tolerated the procedure well and was instructed to contact me immediately if there were any problems.  Ferdinand Matthew D.O.

## 2024-06-07 ENCOUNTER — APPOINTMENT (OUTPATIENT)
Dept: PAIN MANAGEMENT | Facility: CLINIC | Age: 69
End: 2024-06-07
Payer: OTHER MISCELLANEOUS

## 2024-06-07 VITALS — BODY MASS INDEX: 33.34 KG/M2 | HEIGHT: 68 IN | WEIGHT: 220 LBS

## 2024-06-07 DIAGNOSIS — M54.17 RADICULOPATHY, LUMBOSACRAL REGION: ICD-10-CM

## 2024-06-07 DIAGNOSIS — Z98.1 ARTHRODESIS STATUS: ICD-10-CM

## 2024-06-07 DIAGNOSIS — M51.26 OTHER INTERVERTEBRAL DISC DISPLACEMENT, LUMBAR REGION: ICD-10-CM

## 2024-06-07 PROCEDURE — 99214 OFFICE O/P EST MOD 30 MIN: CPT

## 2024-06-07 RX ORDER — OXYCODONE AND ACETAMINOPHEN 5; 325 MG/1; MG/1
5-325 TABLET ORAL
Qty: 60 | Refills: 0 | Status: ACTIVE | COMMUNITY
Start: 2023-08-22 | End: 1900-01-01

## 2024-06-07 NOTE — DISCUSSION/SUMMARY
[de-identified] : After discussing various treatment options with the patient including but not limited to oral medications, physical therapy, exercise modalities as well as interventional spinal injections, we have decided with the following plan:  - Continue Home exercises, stretching, activity modification, physical therapy, and conservative care. - MRI report and/or images was reviewed and discussed with the patient. - Recommend L4-5 Lumbar Epidural Steroid Injection under fluoroscopic guidance with image. - The risks, benefits and alternatives of the proposed procedure were explained in detail with the patient. The risks outlined include but are not limited to infection, bleeding, post-dural puncture headache, nerve injury, a temporary increase in pain, failure to resolve symptoms, allergic reaction, symptom recurrence, and possible elevation of blood sugar in diabetics. All questions were answered to patient's apparent satisfaction and he/she verbalized an understanding. - Patient is presenting with acute/sub-acute radicular pain with impairment in ADLs and functionality.  The pain has not responded to conservative care including NSAID therapy and/or physical therapy.  There is no bleeding tendency, unstable medical condition, or systemic infection. - Follow up in 1-2 weeks post injection for re-evaluation.  - Will prescribe Oxycodone/Acetaminophen 5/325 Q12hrs PRN #60 for pain control. I have consulted the  Registry for the purpose of reviewing the patient's controlled substances. Risks of opioid use for chronic non-cancer pain discussed at length, including development of tolerance, addiction, opioid-induced hyperalgesia, hypogonadism, disturbance of sleep, etc. Explained that current medical evidence does not support the long-term use of opioids for this patient's condition, and that our plan is to eventually discontinue opioids altogether

## 2024-06-07 NOTE — PHYSICAL EXAM
[de-identified] : Constitutional; Appears well, no apparent distress Ability to communicate: Normal  Respiratory: non-labored breathing Skin: No rash noted Head: Normocephalic, atraumatic Neck: no visible thyroid enlargement Eyes: Extraocular movements intact Neurologic: Alert and oriented x3 Psychiatric: normal mood, affect and behavior [] : non-antalgic

## 2024-06-07 NOTE — HISTORY OF PRESENT ILLNESS
[Lower back] : lower back [Work related] : work related [9] : 9 [8] : 8 [Burning] : burning [Dull/Aching] : dull/aching [Radiating] : radiating [Sharp] : sharp [Shooting] : shooting [Stabbing] : stabbing [Constant] : constant [Household chores] : household chores [Leisure] : leisure [Sleep] : sleep [Nothing helps with pain getting better] : Nothing helps with pain getting better [Sitting] : sitting [Standing] : standing [Walking] : walking [Not working due to injury] : Work status: not working due to injury [FreeTextEntry1] : 06/07/2024: s/p L3-4 LESI on 05/20/24 with >50% relief and improvement of ADLs. Pain was better for 4-5 days and then started to return.  Pain still radiating down the b/l legs to the toes.  Pt would like to try one more SAMARIA prior to considering surgery.   03/22/2024: s/p L4-5 LESI on 03/04/24 with 50% relief and improvement of ADLs.    02/16/2024: Was supposed to have L4-5 LESI but just recently got approval and would like to schedule for SAMARIA. Pain unchanged since previous visit and still radiating down the b/l legs to the toes.   Initial HPI 11/10/2023: WC 3/22/06 fell through scaffolding  Pain is in the center of the lower back and radiates down the bilateral thighs and lower legs to the toes described as a sharp pain with associated numbness and tingling. Saw Dr. Mclaughlin who recommended LESI.   MRI Lumbar Spine 7/31/23 independently reviewed: L4-5 HNP with moderate central stenosis, Conservative Care: has completed PT with little relief  Pain Medications: ibuprofen 800mg, Percocet 5/325  Past Injections: has ESIs prior to his surgery with no relief  Spine surgery: L5-S1 fusion in 2006. Blood thinners: none [] : no [FreeTextEntry3] : 03/22/06 [FreeTextEntry7] : B/L legs  [de-identified] : L MRI [TWNoteComboBox1] : 50%

## 2024-07-15 ENCOUNTER — APPOINTMENT (OUTPATIENT)
Dept: PAIN MANAGEMENT | Facility: CLINIC | Age: 69
End: 2024-07-15
Payer: OTHER MISCELLANEOUS

## 2024-07-15 DIAGNOSIS — M54.17 RADICULOPATHY, LUMBOSACRAL REGION: ICD-10-CM

## 2024-07-15 PROCEDURE — 62323 NJX INTERLAMINAR LMBR/SAC: CPT

## 2024-08-02 ENCOUNTER — APPOINTMENT (OUTPATIENT)
Dept: PAIN MANAGEMENT | Facility: CLINIC | Age: 69
End: 2024-08-02
Payer: OTHER MISCELLANEOUS

## 2024-08-02 VITALS — BODY MASS INDEX: 33.34 KG/M2 | HEIGHT: 68 IN | WEIGHT: 220 LBS

## 2024-08-02 DIAGNOSIS — M47.816 SPONDYLOSIS W/OUT MYELOPATHY OR RADICULOPATHY, LUMBAR REGION: ICD-10-CM

## 2024-08-02 PROCEDURE — 99214 OFFICE O/P EST MOD 30 MIN: CPT

## 2024-08-02 NOTE — DISCUSSION/SUMMARY
[de-identified] : After discussing various treatment options with the patient including but not limited to oral medications, physical therapy, exercise modalities as well as interventional spinal injections, we have decided with the following plan:  - Continue Home exercises, stretching, activity modification, physical therapy, and conservative care. - MRI report and/or images was reviewed and discussed with the patient. - Recommend First Diagnostic Bilateral L3,L4,L5 Medial Branch Blocks under fluoroscopic guidance with image. - Patient presents with axial lumbar pain that has not responded to 3 months of conservative therapy including physical therapy or NSAID therapy.  The pain is interfering with activities of daily living and functionality. There is no radicular pain. The pain is exacerbated by facet loading / positive Kemps maneuver which is defined by pain reproduction with extension and rotation of the lumbar spine to the affected side.  The patient has not had a vertebral fusion at the levels of the proposed treatment.  There is no unexplained neurologic deficit.  There is no history of systemic infection, unstable medical condition, bleeding tendency, or local infection.  The injection is being performed to diagnose the facet joint as the source of the individual's pain, in preparation for a radiofrequency ablation.  - The risks, benefits, contents and alternatives to injection were explained in full to the patient.  Risks outlined include but are not limited to infection, sepsis, bleeding, post-dural puncture headache, nerve damage, temporary increase in pain, syncopal episode, failure to resolve symptoms, allergic reaction, symptom recurrence, and elevation of blood sugar in diabetics. Cortisone may cause immunosuppression.  Patient understands the risks.  All questions were answered.  After discussion of options, patient requested an injection.  Information regarding the injection was given to the patient.  Which medications to stop prior to the injection was explained to the patient as well. - Follow up in 1-2 weeks post injection for re-evaluation. - Recommend to follow-up with a spine specialist for surgical consultation. However pt would like to exhaust all possibilities before consider surgery.  Will try MBB.   - Will prescribe Oxycodone/Acetaminophen 5/325 Q12hrs PRN #60 for pain control. I have consulted the  Registry for the purpose of reviewing the patient's controlled substances. Risks of opioid use for chronic non-cancer pain discussed at length, including development of tolerance, addiction, opioid-induced hyperalgesia, hypogonadism, disturbance of sleep, etc. Explained that current medical evidence does not support the long-term use of opioids for this patient's condition, and that our plan is to eventually discontinue opioids altogether

## 2024-08-02 NOTE — HISTORY OF PRESENT ILLNESS
[Lower back] : lower back [Work related] : work related [9] : 9 [8] : 8 [Burning] : burning [Dull/Aching] : dull/aching [Radiating] : radiating [Sharp] : sharp [Shooting] : shooting [Stabbing] : stabbing [Constant] : constant [Leisure] : leisure [Household chores] : household chores [Sleep] : sleep [Nothing helps with pain getting better] : Nothing helps with pain getting better [Sitting] : sitting [Standing] : standing [Walking] : walking [Not working due to injury] : Work status: not working due to injury [TWNoteComboBox1] : 50% [FreeTextEntry1] : 08/02/2024: s/p L4-5 LESI on 07/15/24 with no relief.   06/07/2024: s/p L3-4 LESI on 05/20/24 with >50% relief and improvement of ADLs. Pain was better for 4-5 days and then started to return.  Pain still radiating down the b/l legs to the toes.  Pt would like to try one more SAMARIA prior to considering surgery.   03/22/2024: s/p L4-5 LESI on 03/04/24 with 50% relief and improvement of ADLs.    02/16/2024: Was supposed to have L4-5 LESI but just recently got approval and would like to schedule for SAMARIA. Pain unchanged since previous visit and still radiating down the b/l legs to the toes.   Initial HPI 11/10/2023: WC 3/22/06 fell through scaffolding  Pain is in the center of the lower back and radiates down the bilateral thighs and lower legs to the toes described as a sharp pain with associated numbness and tingling. Saw Dr. Mclaughlin who recommended LESI.   MRI Lumbar Spine 7/31/23 independently reviewed: L4-5 HNP with moderate central stenosis, Conservative Care: has completed PT with little relief  Pain Medications: ibuprofen 800mg, Percocet 5/325  Past Injections: has ESIs prior to his surgery with no relief  Spine surgery: L5-S1 fusion in 2006. Blood thinners: none [] : no [FreeTextEntry3] : 03/22/06 [FreeTextEntry7] : B/L legs  [de-identified] : L MRI

## 2024-08-02 NOTE — PHYSICAL EXAM
[de-identified] : Constitutional; Appears well, no apparent distress Ability to communicate: Normal  Respiratory: non-labored breathing Skin: No rash noted Head: Normocephalic, atraumatic Neck: no visible thyroid enlargement Eyes: Extraocular movements intact Neurologic: Alert and oriented x3 Psychiatric: normal mood, affect and behavior [] : non-antalgic

## 2024-08-02 NOTE — DISCUSSION/SUMMARY
[de-identified] : After discussing various treatment options with the patient including but not limited to oral medications, physical therapy, exercise modalities as well as interventional spinal injections, we have decided with the following plan:  - Continue Home exercises, stretching, activity modification, physical therapy, and conservative care. - MRI report and/or images was reviewed and discussed with the patient. - Recommend First Diagnostic Bilateral L3,L4,L5 Medial Branch Blocks under fluoroscopic guidance with image. - Patient presents with axial lumbar pain that has not responded to 3 months of conservative therapy including physical therapy or NSAID therapy.  The pain is interfering with activities of daily living and functionality. There is no radicular pain. The pain is exacerbated by facet loading / positive Kemps maneuver which is defined by pain reproduction with extension and rotation of the lumbar spine to the affected side.  The patient has not had a vertebral fusion at the levels of the proposed treatment.  There is no unexplained neurologic deficit.  There is no history of systemic infection, unstable medical condition, bleeding tendency, or local infection.  The injection is being performed to diagnose the facet joint as the source of the individual's pain, in preparation for a radiofrequency ablation.  - The risks, benefits, contents and alternatives to injection were explained in full to the patient.  Risks outlined include but are not limited to infection, sepsis, bleeding, post-dural puncture headache, nerve damage, temporary increase in pain, syncopal episode, failure to resolve symptoms, allergic reaction, symptom recurrence, and elevation of blood sugar in diabetics. Cortisone may cause immunosuppression.  Patient understands the risks.  All questions were answered.  After discussion of options, patient requested an injection.  Information regarding the injection was given to the patient.  Which medications to stop prior to the injection was explained to the patient as well. - Follow up in 1-2 weeks post injection for re-evaluation. - Recommend to follow-up with a spine specialist for surgical consultation. However pt would like to exhaust all possibilities before consider surgery.  Will try MBB.   - Will prescribe Oxycodone/Acetaminophen 5/325 Q12hrs PRN #60 for pain control. I have consulted the  Registry for the purpose of reviewing the patient's controlled substances. Risks of opioid use for chronic non-cancer pain discussed at length, including development of tolerance, addiction, opioid-induced hyperalgesia, hypogonadism, disturbance of sleep, etc. Explained that current medical evidence does not support the long-term use of opioids for this patient's condition, and that our plan is to eventually discontinue opioids altogether

## 2024-08-02 NOTE — HISTORY OF PRESENT ILLNESS
[Lower back] : lower back [Work related] : work related [9] : 9 [8] : 8 [Burning] : burning [Dull/Aching] : dull/aching [Radiating] : radiating [Sharp] : sharp [Shooting] : shooting [Stabbing] : stabbing [Constant] : constant [Household chores] : household chores [Leisure] : leisure [Sleep] : sleep [Nothing helps with pain getting better] : Nothing helps with pain getting better [Sitting] : sitting [Standing] : standing [Walking] : walking [Not working due to injury] : Work status: not working due to injury [TWNoteComboBox1] : 50% [FreeTextEntry1] : 08/02/2024: s/p L4-5 LESI on 07/15/24 with no relief.   06/07/2024: s/p L3-4 LESI on 05/20/24 with >50% relief and improvement of ADLs. Pain was better for 4-5 days and then started to return.  Pain still radiating down the b/l legs to the toes.  Pt would like to try one more SAMARIA prior to considering surgery.   03/22/2024: s/p L4-5 LESI on 03/04/24 with 50% relief and improvement of ADLs.    02/16/2024: Was supposed to have L4-5 LESI but just recently got approval and would like to schedule for SAMARIA. Pain unchanged since previous visit and still radiating down the b/l legs to the toes.   Initial HPI 11/10/2023: WC 3/22/06 fell through scaffolding  Pain is in the center of the lower back and radiates down the bilateral thighs and lower legs to the toes described as a sharp pain with associated numbness and tingling. Saw Dr. Mclaughlin who recommended LESI.   MRI Lumbar Spine 7/31/23 independently reviewed: L4-5 HNP with moderate central stenosis, Conservative Care: has completed PT with little relief  Pain Medications: ibuprofen 800mg, Percocet 5/325  Past Injections: has ESIs prior to his surgery with no relief  Spine surgery: L5-S1 fusion in 2006. Blood thinners: none [] : no [FreeTextEntry3] : 03/22/06 [FreeTextEntry7] : B/L legs  [de-identified] : L MRI

## 2024-09-11 ENCOUNTER — APPOINTMENT (OUTPATIENT)
Age: 69
End: 2024-09-11
Payer: OTHER MISCELLANEOUS

## 2024-09-11 PROCEDURE — 64493 INJ PARAVERT F JNT L/S 1 LEV: CPT | Mod: LT

## 2024-09-11 PROCEDURE — 64494 INJ PARAVERT F JNT L/S 2 LEV: CPT | Mod: 59,LT

## 2024-09-30 ENCOUNTER — APPOINTMENT (OUTPATIENT)
Dept: PAIN MANAGEMENT | Facility: CLINIC | Age: 69
End: 2024-09-30
Payer: OTHER MISCELLANEOUS

## 2024-09-30 VITALS — BODY MASS INDEX: 34.25 KG/M2 | WEIGHT: 226 LBS | HEIGHT: 68 IN

## 2024-09-30 DIAGNOSIS — M54.17 RADICULOPATHY, LUMBOSACRAL REGION: ICD-10-CM

## 2024-09-30 DIAGNOSIS — M47.816 SPONDYLOSIS W/OUT MYELOPATHY OR RADICULOPATHY, LUMBAR REGION: ICD-10-CM

## 2024-09-30 PROCEDURE — 99214 OFFICE O/P EST MOD 30 MIN: CPT

## 2024-09-30 NOTE — HISTORY OF PRESENT ILLNESS
[Lower back] : lower back [Work related] : work related [9] : 9 [8] : 8 [Burning] : burning [Dull/Aching] : dull/aching [Radiating] : radiating [Sharp] : sharp [Shooting] : shooting [Stabbing] : stabbing [Constant] : constant [Household chores] : household chores [Leisure] : leisure [Sleep] : sleep [Nothing helps with pain getting better] : Nothing helps with pain getting better [Sitting] : sitting [Standing] : standing [Walking] : walking [Not working due to injury] : Work status: not working due to injury [FreeTextEntry1] : 9/30//24: s/p B/L L3,4,5 MBB on 05/20/24 with >80% relief and improvement of ADLs.  08/02/2024: s/p L4-5 LESI on 07/15/24 with no relief.   06/07/2024: s/p L3-4 LESI on 05/20/24 with >50% relief and improvement of ADLs. Pain was better for 4-5 days and then started to return.  Pain still radiating down the b/l legs to the toes.  Pt would like to try one more SAMARIA prior to considering surgery. 8  03/22/2024: s/p L4-5 LESI on 03/04/24 with 50% relief and improvement of ADLs.    02/16/2024: Was supposed to have L4-5 LESI but just recently got approval and would like to schedule for SAMARIA. Pain unchanged since previous visit and still radiating down the b/l legs to the toes.   Initial HPI 11/10/2023: WC 3/22/06 fell through scaffolding  Pain is in the center of the lower back and radiates down the bilateral thighs and lower legs to the toes described as a sharp pain with associated numbness and tingling. Saw Dr. Mclaughlin who recommended LESI.   MRI Lumbar Spine 7/31/23 independently reviewed: L4-5 HNP with moderate central stenosis, Conservative Care: has completed PT with little relief  Pain Medications: ibuprofen 800mg, Percocet 5/325  Past Injections: has ESIs prior to his surgery with no relief  Spine surgery: L5-S1 fusion in 2006. Blood thinners: none [] : no [FreeTextEntry3] : 03/22/06 [FreeTextEntry7] : B/L legs  [de-identified] : L MRI

## 2024-09-30 NOTE — DISCUSSION/SUMMARY
[de-identified] : After discussing various treatment options with the patient including but not limited to oral medications, physical therapy, exercise modalities as well as interventional spinal injections, we have decided with the following plan:  - Continue Home exercises, stretching, activity modification, physical therapy, and conservative care. - MRI report and/or images was reviewed and discussed with the patient. - Recommend Second Diagnostic Bilateral L3,L4,L5 Medial Branch Blocks under fluoroscopic guidance with image. First diagnostic MBBs resulted in >80% relief and improvement of ADLs for the duration of the local anesthetic  - Patient presents with axial lumbar pain that has not responded to 3 months of conservative therapy including physical therapy or NSAID therapy.  The pain is interfering with activities of daily living and functionality. There is no radicular pain. The pain is exacerbated by facet loading / positive Kemps maneuver which is defined by pain reproduction with extension and rotation of the lumbar spine to the affected side.  The patient has not had a vertebral fusion at the levels of the proposed treatment.  There is no unexplained neurologic deficit.  There is no history of systemic infection, unstable medical condition, bleeding tendency, or local infection.  The injection is being performed to diagnose the facet joint as the source of the individual's pain, in preparation for a radiofrequency ablation.  - The risks, benefits, contents and alternatives to injection were explained in full to the patient.  Risks outlined include but are not limited to infection, sepsis, bleeding, post-dural puncture headache, nerve damage, temporary increase in pain, syncopal episode, failure to resolve symptoms, allergic reaction, symptom recurrence, and elevation of blood sugar in diabetics. Cortisone may cause immunosuppression.  Patient understands the risks.  All questions were answered.  After discussion of options, patient requested an injection.  Information regarding the injection was given to the patient.  Which medications to stop prior to the injection was explained to the patient as well. - Follow up in 1-2 weeks post injection for re-evaluation. - Recommend to follow-up with a spine specialist for surgical consultation.

## 2024-09-30 NOTE — PHYSICAL EXAM
[de-identified] : Constitutional; Appears well, no apparent distress Ability to communicate: Normal  Respiratory: non-labored breathing Skin: No rash noted Head: Normocephalic, atraumatic Neck: no visible thyroid enlargement Eyes: Extraocular movements intact Neurologic: Alert and oriented x3 Psychiatric: normal mood, affect and behavior [] : non-antalgic

## 2024-10-29 ENCOUNTER — APPOINTMENT (OUTPATIENT)
Dept: ENDOCRINOLOGY | Facility: CLINIC | Age: 69
End: 2024-10-29
Payer: MEDICARE

## 2024-10-29 VITALS
DIASTOLIC BLOOD PRESSURE: 70 MMHG | SYSTOLIC BLOOD PRESSURE: 130 MMHG | WEIGHT: 231 LBS | HEIGHT: 68 IN | HEART RATE: 70 BPM | BODY MASS INDEX: 35.01 KG/M2 | OXYGEN SATURATION: 97 %

## 2024-10-29 DIAGNOSIS — I10 ESSENTIAL (PRIMARY) HYPERTENSION: ICD-10-CM

## 2024-10-29 DIAGNOSIS — E04.1 NONTOXIC SINGLE THYROID NODULE: ICD-10-CM

## 2024-10-29 DIAGNOSIS — Z79.4 TYPE 2 DIABETES MELLITUS WITH HYPERGLYCEMIA: ICD-10-CM

## 2024-10-29 DIAGNOSIS — E78.49 OTHER HYPERLIPIDEMIA: ICD-10-CM

## 2024-10-29 DIAGNOSIS — E66.9 OBESITY, UNSPECIFIED: ICD-10-CM

## 2024-10-29 DIAGNOSIS — E11.65 TYPE 2 DIABETES MELLITUS WITH HYPERGLYCEMIA: ICD-10-CM

## 2024-10-29 PROCEDURE — 99204 OFFICE O/P NEW MOD 45 MIN: CPT | Mod: 25

## 2024-10-29 PROCEDURE — 95251 CONT GLUC MNTR ANALYSIS I&R: CPT

## 2024-10-29 RX ORDER — INSULIN GLARGINE 100 [IU]/ML
100 INJECTION, SOLUTION SUBCUTANEOUS
Refills: 0 | Status: ACTIVE | COMMUNITY
Start: 2024-10-29

## 2024-10-29 RX ORDER — TIRZEPATIDE 2.5 MG/.5ML
2.5 INJECTION, SOLUTION SUBCUTANEOUS
Qty: 1 | Refills: 3 | Status: ACTIVE | COMMUNITY
Start: 2024-10-29 | End: 1900-01-01

## 2024-11-18 ENCOUNTER — APPOINTMENT (OUTPATIENT)
Dept: PAIN MANAGEMENT | Facility: CLINIC | Age: 69
End: 2024-11-18

## 2024-12-02 ENCOUNTER — APPOINTMENT (OUTPATIENT)
Dept: PAIN MANAGEMENT | Facility: CLINIC | Age: 69
End: 2024-12-02
Payer: OTHER MISCELLANEOUS

## 2024-12-02 DIAGNOSIS — M47.816 SPONDYLOSIS W/OUT MYELOPATHY OR RADICULOPATHY, LUMBAR REGION: ICD-10-CM

## 2024-12-02 PROCEDURE — 64494 INJ PARAVERT F JNT L/S 2 LEV: CPT | Mod: 50,59

## 2024-12-02 PROCEDURE — 64493 INJ PARAVERT F JNT L/S 1 LEV: CPT | Mod: 50

## 2024-12-27 ENCOUNTER — APPOINTMENT (OUTPATIENT)
Dept: PAIN MANAGEMENT | Facility: CLINIC | Age: 69
End: 2024-12-27
Payer: OTHER MISCELLANEOUS

## 2024-12-27 VITALS — WEIGHT: 232 LBS | HEIGHT: 68 IN | BODY MASS INDEX: 35.16 KG/M2

## 2024-12-27 DIAGNOSIS — M47.816 SPONDYLOSIS W/OUT MYELOPATHY OR RADICULOPATHY, LUMBAR REGION: ICD-10-CM

## 2024-12-27 DIAGNOSIS — M54.17 RADICULOPATHY, LUMBOSACRAL REGION: ICD-10-CM

## 2024-12-27 PROCEDURE — 99214 OFFICE O/P EST MOD 30 MIN: CPT

## 2025-02-03 ENCOUNTER — APPOINTMENT (OUTPATIENT)
Dept: ENDOCRINOLOGY | Facility: CLINIC | Age: 70
End: 2025-02-03
Payer: MEDICARE

## 2025-02-03 VITALS
HEART RATE: 68 BPM | DIASTOLIC BLOOD PRESSURE: 74 MMHG | WEIGHT: 218.38 LBS | BODY MASS INDEX: 33.1 KG/M2 | HEIGHT: 68 IN | SYSTOLIC BLOOD PRESSURE: 133 MMHG | OXYGEN SATURATION: 98 %

## 2025-02-03 DIAGNOSIS — E78.49 OTHER HYPERLIPIDEMIA: ICD-10-CM

## 2025-02-03 DIAGNOSIS — E11.65 TYPE 2 DIABETES MELLITUS WITH HYPERGLYCEMIA: ICD-10-CM

## 2025-02-03 DIAGNOSIS — E04.1 NONTOXIC SINGLE THYROID NODULE: ICD-10-CM

## 2025-02-03 DIAGNOSIS — Z79.4 TYPE 2 DIABETES MELLITUS WITH HYPERGLYCEMIA: ICD-10-CM

## 2025-02-03 DIAGNOSIS — I10 ESSENTIAL (PRIMARY) HYPERTENSION: ICD-10-CM

## 2025-02-03 DIAGNOSIS — E66.9 OBESITY, UNSPECIFIED: ICD-10-CM

## 2025-02-03 PROCEDURE — 95251 CONT GLUC MNTR ANALYSIS I&R: CPT

## 2025-02-03 PROCEDURE — 99214 OFFICE O/P EST MOD 30 MIN: CPT | Mod: 25

## 2025-02-03 RX ORDER — EMPAGLIFLOZIN 10 MG/1
10 TABLET, FILM COATED ORAL
Qty: 90 | Refills: 3 | Status: ACTIVE | COMMUNITY
Start: 2025-02-03 | End: 1900-01-01

## 2025-03-03 ENCOUNTER — APPOINTMENT (OUTPATIENT)
Dept: PAIN MANAGEMENT | Facility: CLINIC | Age: 70
End: 2025-03-03
Payer: OTHER MISCELLANEOUS

## 2025-03-03 DIAGNOSIS — M47.816 SPONDYLOSIS W/OUT MYELOPATHY OR RADICULOPATHY, LUMBAR REGION: ICD-10-CM

## 2025-03-03 PROCEDURE — J3490M: CUSTOM

## 2025-03-03 PROCEDURE — 64636Z: CUSTOM | Mod: 50

## 2025-03-03 PROCEDURE — 64635 DESTROY LUMB/SAC FACET JNT: CPT | Mod: 50

## 2025-03-17 ENCOUNTER — APPOINTMENT (OUTPATIENT)
Dept: ENDOCRINOLOGY | Facility: CLINIC | Age: 70
End: 2025-03-17
Payer: MEDICARE

## 2025-03-17 VITALS
HEIGHT: 68 IN | WEIGHT: 214 LBS | BODY MASS INDEX: 32.43 KG/M2 | HEART RATE: 64 BPM | OXYGEN SATURATION: 99 % | DIASTOLIC BLOOD PRESSURE: 77 MMHG | SYSTOLIC BLOOD PRESSURE: 148 MMHG

## 2025-03-17 DIAGNOSIS — E04.1 NONTOXIC SINGLE THYROID NODULE: ICD-10-CM

## 2025-03-17 DIAGNOSIS — R42 DIZZINESS AND GIDDINESS: ICD-10-CM

## 2025-03-17 DIAGNOSIS — E66.9 OBESITY, UNSPECIFIED: ICD-10-CM

## 2025-03-17 DIAGNOSIS — E11.65 TYPE 2 DIABETES MELLITUS WITH HYPERGLYCEMIA: ICD-10-CM

## 2025-03-17 DIAGNOSIS — I10 ESSENTIAL (PRIMARY) HYPERTENSION: ICD-10-CM

## 2025-03-17 DIAGNOSIS — Z79.4 TYPE 2 DIABETES MELLITUS WITH HYPERGLYCEMIA: ICD-10-CM

## 2025-03-17 DIAGNOSIS — E78.49 OTHER HYPERLIPIDEMIA: ICD-10-CM

## 2025-03-17 PROCEDURE — 99214 OFFICE O/P EST MOD 30 MIN: CPT

## 2025-03-20 ENCOUNTER — OUTPATIENT (OUTPATIENT)
Dept: OUTPATIENT SERVICES | Facility: HOSPITAL | Age: 70
LOS: 1 days | End: 2025-03-20
Payer: COMMERCIAL

## 2025-03-20 ENCOUNTER — APPOINTMENT (OUTPATIENT)
Dept: ULTRASOUND IMAGING | Facility: CLINIC | Age: 70
End: 2025-03-20

## 2025-03-20 DIAGNOSIS — M79.604 PAIN IN RIGHT LEG: Chronic | ICD-10-CM

## 2025-03-20 DIAGNOSIS — Z86.69 PERSONAL HISTORY OF OTHER DISEASES OF THE NERVOUS SYSTEM AND SENSE ORGANS: Chronic | ICD-10-CM

## 2025-03-20 DIAGNOSIS — Z98.89 OTHER SPECIFIED POSTPROCEDURAL STATES: Chronic | ICD-10-CM

## 2025-03-20 DIAGNOSIS — Z98.49 CATARACT EXTRACTION STATUS, UNSPECIFIED EYE: Chronic | ICD-10-CM

## 2025-03-20 DIAGNOSIS — E04.1 NONTOXIC SINGLE THYROID NODULE: ICD-10-CM

## 2025-03-20 DIAGNOSIS — Z92.89 PERSONAL HISTORY OF OTHER MEDICAL TREATMENT: Chronic | ICD-10-CM

## 2025-03-20 DIAGNOSIS — Z98.1 ARTHRODESIS STATUS: Chronic | ICD-10-CM

## 2025-03-20 DIAGNOSIS — G56.00 CARPAL TUNNEL SYNDROME, UNSPECIFIED UPPER LIMB: Chronic | ICD-10-CM

## 2025-03-20 DIAGNOSIS — H26.9 UNSPECIFIED CATARACT: Chronic | ICD-10-CM

## 2025-03-20 PROCEDURE — 76536 US EXAM OF HEAD AND NECK: CPT

## 2025-03-20 PROCEDURE — 76536 US EXAM OF HEAD AND NECK: CPT | Mod: 26

## 2025-03-28 ENCOUNTER — APPOINTMENT (OUTPATIENT)
Dept: PAIN MANAGEMENT | Facility: CLINIC | Age: 70
End: 2025-03-28
Payer: OTHER MISCELLANEOUS

## 2025-03-28 VITALS — WEIGHT: 214 LBS | HEIGHT: 68 IN | BODY MASS INDEX: 32.43 KG/M2

## 2025-03-28 DIAGNOSIS — M47.816 SPONDYLOSIS W/OUT MYELOPATHY OR RADICULOPATHY, LUMBAR REGION: ICD-10-CM

## 2025-03-28 DIAGNOSIS — M54.17 RADICULOPATHY, LUMBOSACRAL REGION: ICD-10-CM

## 2025-03-28 DIAGNOSIS — M51.26 OTHER INTERVERTEBRAL DISC DISPLACEMENT, LUMBAR REGION: ICD-10-CM

## 2025-03-28 PROCEDURE — 99213 OFFICE O/P EST LOW 20 MIN: CPT

## 2025-03-28 RX ORDER — GABAPENTIN 300 MG/1
300 CAPSULE ORAL
Qty: 60 | Refills: 0 | Status: ACTIVE | COMMUNITY
Start: 2025-03-28 | End: 1900-01-01

## 2025-06-24 ENCOUNTER — APPOINTMENT (OUTPATIENT)
Dept: ENDOCRINOLOGY | Facility: CLINIC | Age: 70
End: 2025-06-24

## 2025-06-25 ENCOUNTER — APPOINTMENT (OUTPATIENT)
Dept: CARDIOLOGY | Facility: CLINIC | Age: 70
End: 2025-06-25

## 2025-07-08 ENCOUNTER — APPOINTMENT (OUTPATIENT)
Dept: PAIN MANAGEMENT | Facility: CLINIC | Age: 70
End: 2025-07-08
Payer: OTHER MISCELLANEOUS

## 2025-07-08 VITALS — HEIGHT: 68 IN | WEIGHT: 212 LBS | BODY MASS INDEX: 32.13 KG/M2

## 2025-07-08 PROCEDURE — 99214 OFFICE O/P EST MOD 30 MIN: CPT

## 2025-07-29 ENCOUNTER — APPOINTMENT (OUTPATIENT)
Dept: CARDIOLOGY | Facility: CLINIC | Age: 70
End: 2025-07-29
Payer: MEDICARE

## 2025-07-29 VITALS — SYSTOLIC BLOOD PRESSURE: 160 MMHG | HEART RATE: 66 BPM | DIASTOLIC BLOOD PRESSURE: 80 MMHG

## 2025-07-29 VITALS
WEIGHT: 194 LBS | OXYGEN SATURATION: 98 % | SYSTOLIC BLOOD PRESSURE: 152 MMHG | BODY MASS INDEX: 29.4 KG/M2 | HEART RATE: 83 BPM | HEIGHT: 68 IN | DIASTOLIC BLOOD PRESSURE: 60 MMHG

## 2025-07-29 DIAGNOSIS — R42 DIZZINESS AND GIDDINESS: ICD-10-CM

## 2025-07-29 DIAGNOSIS — Z01.810 ENCOUNTER FOR PREPROCEDURAL CARDIOVASCULAR EXAMINATION: ICD-10-CM

## 2025-07-29 DIAGNOSIS — N40.0 BENIGN PROSTATIC HYPERPLASIA WITHOUT LOWER URINARY TRACT SYMPMS: ICD-10-CM

## 2025-07-29 PROCEDURE — 93000 ELECTROCARDIOGRAM COMPLETE: CPT | Mod: NC

## 2025-07-29 PROCEDURE — 99204 OFFICE O/P NEW MOD 45 MIN: CPT | Mod: 25

## 2025-07-30 PROBLEM — N40.0 PROSTATIC HYPERTROPHY: Status: ACTIVE | Noted: 2025-07-30

## 2025-07-30 PROBLEM — Z01.810 PREOPERATIVE CARDIOVASCULAR EXAMINATION: Status: ACTIVE | Noted: 2025-07-30

## 2025-08-01 ENCOUNTER — TRANSCRIPTION ENCOUNTER (OUTPATIENT)
Age: 70
End: 2025-08-01

## 2025-08-13 ENCOUNTER — APPOINTMENT (OUTPATIENT)
Dept: CARDIOLOGY | Facility: CLINIC | Age: 70
End: 2025-08-13
Payer: MEDICARE

## 2025-08-13 VITALS
WEIGHT: 195 LBS | DIASTOLIC BLOOD PRESSURE: 80 MMHG | BODY MASS INDEX: 29.55 KG/M2 | SYSTOLIC BLOOD PRESSURE: 128 MMHG | HEIGHT: 68 IN | HEART RATE: 89 BPM | OXYGEN SATURATION: 98 %

## 2025-08-13 DIAGNOSIS — N18.9 CHRONIC KIDNEY DISEASE, UNSPECIFIED: ICD-10-CM

## 2025-08-13 DIAGNOSIS — I48.0 PAROXYSMAL ATRIAL FIBRILLATION: ICD-10-CM

## 2025-08-13 DIAGNOSIS — R06.09 OTHER FORMS OF DYSPNEA: ICD-10-CM

## 2025-08-13 DIAGNOSIS — R94.31 ABNORMAL ELECTROCARDIOGRAM [ECG] [EKG]: ICD-10-CM

## 2025-08-13 PROCEDURE — 93000 ELECTROCARDIOGRAM COMPLETE: CPT

## 2025-08-13 PROCEDURE — 99214 OFFICE O/P EST MOD 30 MIN: CPT | Mod: 25

## 2025-08-13 RX ORDER — APIXABAN 5 MG/1
5 TABLET, FILM COATED ORAL
Refills: 0 | Status: ACTIVE | COMMUNITY
Start: 2025-08-13

## 2025-08-13 RX ORDER — METOPROLOL SUCCINATE 25 MG/1
25 TABLET, EXTENDED RELEASE ORAL
Refills: 0 | Status: ACTIVE | COMMUNITY
Start: 2025-08-13

## 2025-09-02 ENCOUNTER — NON-APPOINTMENT (OUTPATIENT)
Age: 70
End: 2025-09-02

## 2025-09-03 ENCOUNTER — APPOINTMENT (OUTPATIENT)
Dept: CARDIOLOGY | Facility: CLINIC | Age: 70
End: 2025-09-03
Payer: MEDICARE

## 2025-09-03 PROCEDURE — A9500: CPT

## 2025-09-03 PROCEDURE — 78452 HT MUSCLE IMAGE SPECT MULT: CPT

## 2025-09-03 PROCEDURE — 93015 CV STRESS TEST SUPVJ I&R: CPT

## 2025-09-04 ENCOUNTER — TRANSCRIPTION ENCOUNTER (OUTPATIENT)
Age: 70
End: 2025-09-04

## 2025-09-04 DIAGNOSIS — R94.39 ABNORMAL RESULT OF OTHER CARDIOVASCULAR FUNCTION STUDY: ICD-10-CM

## 2025-09-05 ENCOUNTER — APPOINTMENT (OUTPATIENT)
Dept: PAIN MANAGEMENT | Facility: CLINIC | Age: 70
End: 2025-09-05
Payer: OTHER MISCELLANEOUS

## 2025-09-05 VITALS — WEIGHT: 195 LBS | BODY MASS INDEX: 29.55 KG/M2 | HEIGHT: 68 IN

## 2025-09-05 DIAGNOSIS — M54.17 RADICULOPATHY, LUMBOSACRAL REGION: ICD-10-CM

## 2025-09-05 DIAGNOSIS — M47.816 SPONDYLOSIS W/OUT MYELOPATHY OR RADICULOPATHY, LUMBAR REGION: ICD-10-CM

## 2025-09-05 PROCEDURE — 99214 OFFICE O/P EST MOD 30 MIN: CPT

## 2025-09-17 ENCOUNTER — NON-APPOINTMENT (OUTPATIENT)
Age: 70
End: 2025-09-17